# Patient Record
Sex: FEMALE | Race: WHITE | Employment: OTHER | URBAN - METROPOLITAN AREA
[De-identification: names, ages, dates, MRNs, and addresses within clinical notes are randomized per-mention and may not be internally consistent; named-entity substitution may affect disease eponyms.]

---

## 2023-08-16 LAB
C REACTIVE PROTEIN (MG/L) IN SER/PLAS: 0.37 MG/DL
COMPLEMENT C3 (MG/DL) IN SER/PLAS: 147 MG/DL (ref 87–200)
COMPLEMENT C4 (MG/DL) IN SER/PLAS: 29 MG/DL (ref 10–50)
CREATINE KINASE (U/L) IN SER/PLAS: 96 U/L (ref 0–215)

## 2023-08-18 LAB
ALBUMIN ELP: 4 G/DL (ref 3.4–5)
ALPHA 1: 0.3 G/DL (ref 0.2–0.6)
ALPHA 2: 0.6 G/DL (ref 0.4–1.1)
BETA: 0.7 G/DL (ref 0.5–1.2)
GAMMA GLOBULIN: 1.5 G/DL (ref 0.5–1.4)
PATH REVIEW-SERUM PROTEIN ELECTROPHORESIS: NORMAL
PROTEIN ELECTROPHORESIS INTERPRETATION: ABNORMAL
PROTEIN TOTAL: 7.1 G/DL (ref 6.4–8.2)

## 2024-03-25 RX ORDER — FAMOTIDINE 20 MG/1
1 TABLET, FILM COATED ORAL 2 TIMES DAILY
COMMUNITY
Start: 2022-04-20

## 2024-03-25 RX ORDER — DICYCLOMINE HYDROCHLORIDE 10 MG/1
CAPSULE ORAL EVERY 6 HOURS
COMMUNITY
Start: 2022-04-20

## 2024-03-25 RX ORDER — ZAFIRLUKAST 20 MG/1
1 TABLET, FILM COATED ORAL 2 TIMES DAILY
COMMUNITY
Start: 2022-04-20 | End: 2024-03-26 | Stop reason: WASHOUT

## 2024-03-25 RX ORDER — EPINEPHRINE 0.3 MG/.3ML
0.3 INJECTION INTRAMUSCULAR
COMMUNITY
Start: 2022-04-20

## 2024-03-25 RX ORDER — ERGOCALCIFEROL 1.25 MG/1
1 CAPSULE ORAL
COMMUNITY
Start: 2022-04-20

## 2024-03-25 RX ORDER — DIPHENOXYLATE HYDROCHLORIDE AND ATROPINE SULFATE 2.5; .025 MG/1; MG/1
TABLET ORAL 4 TIMES DAILY
COMMUNITY
Start: 2022-04-08

## 2024-03-25 RX ORDER — LEVONORGESTREL 52 MG/1
INTRAUTERINE DEVICE INTRAUTERINE
COMMUNITY
Start: 2022-04-20

## 2024-03-25 RX ORDER — LISDEXAMFETAMINE DIMESYLATE CAPSULES 20 MG/1
1 CAPSULE ORAL DAILY
COMMUNITY
Start: 2022-01-31 | End: 2024-03-26 | Stop reason: ALTCHOICE

## 2024-03-25 RX ORDER — IPRATROPIUM BROMIDE 17 UG/1
AEROSOL, METERED RESPIRATORY (INHALATION) 4 TIMES DAILY
COMMUNITY
Start: 2022-04-20

## 2024-03-25 RX ORDER — LEVALBUTEROL TARTRATE 45 UG/1
AEROSOL, METERED ORAL EVERY 6 HOURS
COMMUNITY
Start: 2022-02-23

## 2024-03-25 RX ORDER — RIZATRIPTAN BENZOATE 10 MG/1
TABLET ORAL
COMMUNITY
Start: 2022-04-20

## 2024-03-25 RX ORDER — SPIRONOLACTONE 25 MG/1
1 TABLET ORAL DAILY
COMMUNITY
Start: 2022-04-20

## 2024-03-25 RX ORDER — PROCHLORPERAZINE MALEATE 10 MG
TABLET ORAL
COMMUNITY
Start: 2022-05-16

## 2024-03-25 RX ORDER — MOMETASONE FUROATE AND FORMOTEROL FUMARATE DIHYDRATE 200; 5 UG/1; UG/1
2 AEROSOL RESPIRATORY (INHALATION) 2 TIMES DAILY
COMMUNITY
Start: 2022-03-15

## 2024-03-25 RX ORDER — OXYBUTYNIN CHLORIDE 10 MG/1
1 TABLET, EXTENDED RELEASE ORAL DAILY
COMMUNITY
Start: 2022-04-20

## 2024-03-25 RX ORDER — AZATHIOPRINE 50 MG/1
TABLET ORAL
COMMUNITY
Start: 2022-05-22 | End: 2024-03-26 | Stop reason: WASHOUT

## 2024-03-25 RX ORDER — FLUTICASONE PROPIONATE 50 MCG
SPRAY, SUSPENSION (ML) NASAL
COMMUNITY
Start: 2022-04-20

## 2024-03-26 ENCOUNTER — OFFICE VISIT (OUTPATIENT)
Dept: RHEUMATOLOGY | Facility: CLINIC | Age: 34
End: 2024-03-26
Payer: MEDICARE

## 2024-03-26 ENCOUNTER — HOSPITAL ENCOUNTER (OUTPATIENT)
Dept: RADIOLOGY | Facility: CLINIC | Age: 34
Discharge: HOME | End: 2024-03-26
Payer: MEDICARE

## 2024-03-26 VITALS
BODY MASS INDEX: 46.27 KG/M2 | DIASTOLIC BLOOD PRESSURE: 94 MMHG | HEART RATE: 90 BPM | WEIGHT: 253 LBS | RESPIRATION RATE: 20 BRPM | OXYGEN SATURATION: 96 % | SYSTOLIC BLOOD PRESSURE: 153 MMHG | TEMPERATURE: 99 F

## 2024-03-26 DIAGNOSIS — M25.50 HYPERMOBILITY ARTHRALGIA: ICD-10-CM

## 2024-03-26 DIAGNOSIS — M25.50 HYPERMOBILITY ARTHRALGIA: Primary | ICD-10-CM

## 2024-03-26 PROCEDURE — 99213 OFFICE O/P EST LOW 20 MIN: CPT | Performed by: INTERNAL MEDICINE

## 2024-03-26 PROCEDURE — 72040 X-RAY EXAM NECK SPINE 2-3 VW: CPT

## 2024-03-26 PROCEDURE — 72040 X-RAY EXAM NECK SPINE 2-3 VW: CPT | Performed by: RADIOLOGY

## 2024-03-26 PROCEDURE — 1036F TOBACCO NON-USER: CPT | Performed by: INTERNAL MEDICINE

## 2024-03-26 RX ORDER — BUDESONIDE 0.5 MG/2ML
0.5 INHALANT ORAL 2 TIMES DAILY
COMMUNITY
Start: 2024-01-17

## 2024-03-26 RX ORDER — OMEPRAZOLE 20 MG/1
20 CAPSULE, DELAYED RELEASE ORAL
COMMUNITY
Start: 2022-09-01

## 2024-03-26 RX ORDER — SODIUM CHLORIDE FOR INHALATION 3 %
4 VIAL, NEBULIZER (ML) INHALATION 2 TIMES DAILY
COMMUNITY
Start: 2023-10-26

## 2024-03-26 RX ORDER — SODIUM CHLORIDE, SODIUM LACTATE, POTASSIUM CHLORIDE, CALCIUM CHLORIDE 600; 310; 30; 20 MG/100ML; MG/100ML; MG/100ML; MG/100ML
1000 INJECTION, SOLUTION INTRAVENOUS
COMMUNITY
Start: 2024-03-19 | End: 2024-03-26

## 2024-03-26 RX ORDER — METHYLPREDNISOLONE 4 MG/1
TABLET ORAL
COMMUNITY
Start: 2024-02-12

## 2024-03-26 RX ORDER — SCOLOPAMINE TRANSDERMAL SYSTEM 1 MG/1
PATCH, EXTENDED RELEASE TRANSDERMAL
COMMUNITY
Start: 2024-03-04

## 2024-03-26 RX ORDER — MIDODRINE HYDROCHLORIDE 2.5 MG/1
2.5 TABLET ORAL EVERY 8 HOURS PRN
COMMUNITY
Start: 2022-09-30

## 2024-03-26 RX ORDER — CHLORHEXIDINE GLUCONATE 40 MG/ML
SOLUTION TOPICAL
COMMUNITY
Start: 2022-06-29

## 2024-03-26 RX ORDER — BENZOYL PEROXIDE 50 MG/ML
LIQUID TOPICAL
COMMUNITY
Start: 2023-07-18

## 2024-03-26 RX ORDER — BENZONATATE 100 MG/1
100 CAPSULE ORAL EVERY 8 HOURS PRN
COMMUNITY
Start: 2023-03-07

## 2024-03-26 RX ORDER — DEXTROAMPHETAMINE SACCHARATE, AMPHETAMINE ASPARTATE MONOHYDRATE, DEXTROAMPHETAMINE SULFATE AND AMPHETAMINE SULFATE 5; 5; 5; 5 MG/1; MG/1; MG/1; MG/1
20 CAPSULE, EXTENDED RELEASE ORAL
COMMUNITY
Start: 2023-10-14 | End: 2024-04-05

## 2024-03-26 RX ORDER — CYANOCOBALAMIN 1000 UG/ML
1000 INJECTION, SOLUTION INTRAMUSCULAR; SUBCUTANEOUS
COMMUNITY
Start: 2022-05-17

## 2024-03-26 RX ORDER — PROMETHAZINE HYDROCHLORIDE 25 MG/1
25 TABLET ORAL EVERY 6 HOURS PRN
COMMUNITY
Start: 2023-03-07

## 2024-03-26 RX ORDER — GUANFACINE 1 MG/1
1 TABLET, EXTENDED RELEASE ORAL NIGHTLY
COMMUNITY
Start: 2022-10-19

## 2024-03-26 RX ORDER — NAPROXEN 500 MG/1
500 TABLET ORAL EVERY 12 HOURS PRN
COMMUNITY
Start: 2023-06-23

## 2024-03-26 RX ORDER — LEVOCETIRIZINE DIHYDROCHLORIDE 5 MG/1
5 TABLET, FILM COATED ORAL
COMMUNITY
Start: 2024-01-17

## 2024-03-26 RX ORDER — AMMONIUM LACTATE 12 G/100G
CREAM TOPICAL
COMMUNITY
Start: 2022-09-29

## 2024-03-26 RX ORDER — CAPTOPRIL 12.5 MG/1
6.25 TABLET ORAL EVERY 12 HOURS PRN
COMMUNITY
Start: 2022-09-30

## 2024-03-26 RX ORDER — NITROFURANTOIN (MACROCRYSTALS) 100 MG/1
CAPSULE ORAL
COMMUNITY
Start: 2024-01-15

## 2024-03-26 RX ORDER — IBUPROFEN 800 MG/1
800 TABLET ORAL EVERY 8 HOURS PRN
COMMUNITY
Start: 2024-01-05

## 2024-03-26 RX ORDER — CLINDAMYCIN PHOSPHATE 10 UG/ML
LOTION TOPICAL
COMMUNITY
Start: 2023-07-18

## 2024-03-26 RX ORDER — TEZEPELUMAB-EKKO 210 MG/1.9ML
210 INJECTION, SOLUTION SUBCUTANEOUS
COMMUNITY

## 2024-03-26 RX ORDER — BUDESONIDE, GLYCOPYRROLATE, AND FORMOTEROL FUMARATE 160; 9; 4.8 UG/1; UG/1; UG/1
AEROSOL, METERED RESPIRATORY (INHALATION)
COMMUNITY

## 2024-03-26 RX ORDER — IPRATROPIUM BROMIDE 21 UG/1
SPRAY, METERED NASAL
COMMUNITY
Start: 2024-01-15

## 2024-03-26 RX ORDER — CYCLOSPORINE 0.5 MG/ML
EMULSION OPHTHALMIC
COMMUNITY
Start: 2024-01-15

## 2024-03-26 RX ORDER — AZITHROMYCIN 250 MG/1
TABLET, FILM COATED ORAL
COMMUNITY
Start: 2022-09-01

## 2024-03-26 RX ORDER — FLUTICASONE PROPIONATE 220 UG/1
AEROSOL, METERED RESPIRATORY (INHALATION)
COMMUNITY
Start: 2024-01-15

## 2024-03-26 RX ORDER — ACETYLCYSTEINE 100 MG/ML
SOLUTION ORAL; RESPIRATORY (INHALATION)
COMMUNITY

## 2024-03-26 ASSESSMENT — PAIN SCALES - GENERAL: PAINLEVEL: 4

## 2024-03-26 NOTE — PROGRESS NOTES
She complains of rather generalized musculoskeletal pain and stiffness.  She has not had any recurrence of uveitis symptoms.  She is on Remicade infusion therapy every 6 weeks.  She continues to take IVIG infusions every 3 weeks.  She was recently treated with steroids because of respiratory tract infection symptoms.  She has not noted any improvement in the musculoskeletal pain with taking the prednisone.  She notes dryness for which she uses Restasis eyedrops and moisturizing eyedrops.  She receives lactated Ringer's infusions for rehydration therapy.  She continues to note loose bowel movements endoscopies in the past have not been diagnostic of inflammatory bowel disease.  She notes some numbness in her hands and feet.  She notes improvement in neck pain with physical therapy and neck realignment.  She has noted increased vertigo symptoms.  She has been using a scopolamine patch.  She has noted more frequent migraine headaches.  Azathioprine was discontinued due to recurrent infections and nausea.    The lungs, heart, abdomen, and extremities are benign.  The musculoskeletal examination shows preserved passive range of motion of the upper and lower extremity joints without joint effusions.  There is mild diffuse tenderness in the upper and lower extremities.  The straight leg raise test is normal bilaterally in the seated position.    X-ray cervical spine (3/26/2024) 2 mm posterior subluxation of the deltoid from the anterior arch of C1 with neck flexion and resolution of the subluxation with neck extension.  No fractures or evidence of syndesmophyte formation.  MRI/MRA scan brain (3/4/2024) no evidence of intracranial mass or extra-axial fluid collection.  There is normal white matter signal.  The craniocervical junction is normal.  The left transverse and sigmoid sinus complex is larger than the right.    Laboratory (8/15/2023) CRP 0.37, CPK 96, C4 29, C3 147, serum protein electrophoresis: Polyclonal  gammopathy.    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  She has slight subluxation of the atlantoaxial joint not sufficient to be expected to cause the upper and lower extremity paresthesia.    She is to continue physical therapy as needed.  No new medications prescribed.  She is to return at the next available office appointment.

## 2024-07-03 DIAGNOSIS — Z00.6 RESEARCH SUBJECT: Primary | ICD-10-CM

## 2024-07-24 DIAGNOSIS — Z00.6 RESEARCH SUBJECT: Primary | ICD-10-CM

## 2024-07-24 DIAGNOSIS — T78.49XA OTHER ALLERGY, INITIAL ENCOUNTER: ICD-10-CM

## 2024-07-29 ENCOUNTER — HOSPITAL ENCOUNTER (OUTPATIENT)
Dept: RESPIRATORY THERAPY | Facility: HOSPITAL | Age: 34
Discharge: HOME | End: 2024-07-29
Payer: MEDICARE

## 2024-07-29 ENCOUNTER — CLINICAL SUPPORT (OUTPATIENT)
Dept: PEDIATRIC PULMONOLOGY | Facility: HOSPITAL | Age: 34
End: 2024-07-29
Payer: MEDICARE

## 2024-07-29 DIAGNOSIS — T78.49XA OTHER ALLERGY, INITIAL ENCOUNTER: ICD-10-CM

## 2024-07-29 DIAGNOSIS — Z00.6 RESEARCH SUBJECT: ICD-10-CM

## 2024-07-29 LAB
ALBUMIN SERPL BCP-MCNC: 4.1 G/DL (ref 3.4–5)
ALP SERPL-CCNC: 43 U/L (ref 33–110)
ALT SERPL W P-5'-P-CCNC: 13 U/L (ref 7–45)
ANION GAP SERPL CALC-SCNC: 17 MMOL/L (ref 10–20)
APPEARANCE UR: ABNORMAL
AST SERPL W P-5'-P-CCNC: 17 U/L (ref 9–39)
BACTERIA #/AREA URNS AUTO: ABNORMAL /HPF
BASOPHILS # BLD AUTO: 0.04 X10*3/UL (ref 0–0.1)
BASOPHILS NFR BLD AUTO: 0.5 %
BILIRUB SERPL-MCNC: 0.4 MG/DL (ref 0–1.2)
BILIRUB UR STRIP.AUTO-MCNC: NEGATIVE MG/DL
BUN SERPL-MCNC: 16 MG/DL (ref 6–23)
CALCIUM SERPL-MCNC: 9.4 MG/DL (ref 8.6–10.6)
CHLORIDE SERPL-SCNC: 103 MMOL/L (ref 98–107)
CO2 SERPL-SCNC: 21 MMOL/L (ref 21–32)
COLOR UR: YELLOW
CREAT SERPL-MCNC: 0.58 MG/DL (ref 0.5–1.05)
EGFRCR SERPLBLD CKD-EPI 2021: >90 ML/MIN/1.73M*2
EOSINOPHIL # BLD AUTO: 0.02 X10*3/UL (ref 0–0.7)
EOSINOPHIL NFR BLD AUTO: 0.3 %
ERYTHROCYTE [DISTWIDTH] IN BLOOD BY AUTOMATED COUNT: 12.1 % (ref 11.5–14.5)
FEF 25-75: 1.33 L/S
FEV1/FVC: 61 %
FEV1: 2.45 LITERS
FVC: 4.02 LITERS
GLUCOSE SERPL-MCNC: 87 MG/DL (ref 74–99)
GLUCOSE UR STRIP.AUTO-MCNC: NORMAL MG/DL
HCG UR QL IA.RAPID: NEGATIVE
HCT VFR BLD AUTO: 39.2 % (ref 36–46)
HGB BLD-MCNC: 14.1 G/DL (ref 12–16)
IMM GRANULOCYTES # BLD AUTO: 0.03 X10*3/UL (ref 0–0.7)
IMM GRANULOCYTES NFR BLD AUTO: 0.4 % (ref 0–0.9)
KETONES UR STRIP.AUTO-MCNC: NEGATIVE MG/DL
LEUKOCYTE ESTERASE UR QL STRIP.AUTO: ABNORMAL
LYMPHOCYTES # BLD AUTO: 3.33 X10*3/UL (ref 1.2–4.8)
LYMPHOCYTES NFR BLD AUTO: 42.7 %
MCH RBC QN AUTO: 28.6 PG (ref 26–34)
MCHC RBC AUTO-ENTMCNC: 36 G/DL (ref 32–36)
MCV RBC AUTO: 80 FL (ref 80–100)
MONOCYTES # BLD AUTO: 0.45 X10*3/UL (ref 0.1–1)
MONOCYTES NFR BLD AUTO: 5.8 %
MUCOUS THREADS #/AREA URNS AUTO: ABNORMAL /LPF
NEUTROPHILS # BLD AUTO: 3.93 X10*3/UL (ref 1.2–7.7)
NEUTROPHILS NFR BLD AUTO: 50.3 %
NITRITE UR QL STRIP.AUTO: NEGATIVE
NRBC BLD-RTO: 0 /100 WBCS (ref 0–0)
PEF: 5.84 L/S
PH UR STRIP.AUTO: 6 [PH]
PLATELET # BLD AUTO: 285 X10*3/UL (ref 150–450)
POTASSIUM SERPL-SCNC: 4 MMOL/L (ref 3.5–5.3)
PROT SERPL-MCNC: 7.8 G/DL (ref 6.4–8.2)
PROT UR STRIP.AUTO-MCNC: ABNORMAL MG/DL
RBC # BLD AUTO: 4.93 X10*6/UL (ref 4–5.2)
RBC # UR STRIP.AUTO: NEGATIVE /UL
RBC #/AREA URNS AUTO: ABNORMAL /HPF
SODIUM SERPL-SCNC: 137 MMOL/L (ref 136–145)
SP GR UR STRIP.AUTO: 1.03
SQUAMOUS #/AREA URNS AUTO: ABNORMAL /HPF
UROBILINOGEN UR STRIP.AUTO-MCNC: NORMAL MG/DL
WBC # BLD AUTO: 7.8 X10*3/UL (ref 4.4–11.3)
WBC #/AREA URNS AUTO: ABNORMAL /HPF

## 2024-07-29 PROCEDURE — 86003 ALLG SPEC IGE CRUDE XTRC EA: CPT | Performed by: STUDENT IN AN ORGANIZED HEALTH CARE EDUCATION/TRAINING PROGRAM

## 2024-07-29 PROCEDURE — 86003 ALLG SPEC IGE CRUDE XTRC EA: CPT

## 2024-07-29 PROCEDURE — 81025 URINE PREGNANCY TEST: CPT | Performed by: STUDENT IN AN ORGANIZED HEALTH CARE EDUCATION/TRAINING PROGRAM

## 2024-07-29 PROCEDURE — 85025 COMPLETE CBC W/AUTO DIFF WBC: CPT | Performed by: STUDENT IN AN ORGANIZED HEALTH CARE EDUCATION/TRAINING PROGRAM

## 2024-07-29 PROCEDURE — 36415 COLL VENOUS BLD VENIPUNCTURE: CPT

## 2024-07-29 PROCEDURE — 80053 COMPREHEN METABOLIC PANEL: CPT | Performed by: STUDENT IN AN ORGANIZED HEALTH CARE EDUCATION/TRAINING PROGRAM

## 2024-07-29 PROCEDURE — 81001 URINALYSIS AUTO W/SCOPE: CPT | Performed by: STUDENT IN AN ORGANIZED HEALTH CARE EDUCATION/TRAINING PROGRAM

## 2024-07-30 LAB
A ALTERNATA IGE QN: <0.1 KU/L
CAT DANDER IGE QN: <0.1 KU/L
COMMON RAGWEED IGE QN: <0.1 KU/L
D PTERONYSS IGE QN: <0.1 KU/L
DOG DANDER IGE QN: <0.1 KU/L
ROACH IGE QN: <0.1 KU/L
TIMOTHY IGE QN: <0.1 KU/L
WHITE OAK IGE QN: <0.1 KU/L

## 2024-07-31 LAB
ANNOTATION COMMENT IMP: NORMAL
MOUSE EPITH IGE QN: <0.1 KU/L

## 2024-08-09 ENCOUNTER — APPOINTMENT (OUTPATIENT)
Dept: RHEUMATOLOGY | Facility: CLINIC | Age: 34
End: 2024-08-09
Payer: MEDICARE

## 2024-08-09 VITALS
SYSTOLIC BLOOD PRESSURE: 125 MMHG | BODY MASS INDEX: 46.08 KG/M2 | HEIGHT: 62 IN | OXYGEN SATURATION: 95 % | DIASTOLIC BLOOD PRESSURE: 77 MMHG | HEART RATE: 79 BPM | WEIGHT: 250.4 LBS | TEMPERATURE: 98.3 F

## 2024-08-09 DIAGNOSIS — Z86.69 HISTORY OF IRITIS: Primary | ICD-10-CM

## 2024-08-09 RX ORDER — ELETRIPTAN HYDROBROMIDE 40 MG/1
40 TABLET, FILM COATED ORAL
COMMUNITY
Start: 2024-03-28

## 2024-08-09 RX ORDER — ZAFIRLUKAST 20 MG/1
20 TABLET, FILM COATED ORAL 2 TIMES DAILY
COMMUNITY
Start: 2024-07-17

## 2024-08-09 RX ORDER — SODIUM CHLORIDE, SODIUM LACTATE, POTASSIUM CHLORIDE, CALCIUM CHLORIDE 600; 310; 30; 20 MG/100ML; MG/100ML; MG/100ML; MG/100ML
1000 INJECTION, SOLUTION INTRAVENOUS
COMMUNITY
Start: 2024-04-11

## 2024-08-09 RX ORDER — TRETINOIN 1 MG/G
CREAM TOPICAL
COMMUNITY
Start: 2024-06-17

## 2024-08-09 RX ORDER — POTASSIUM CHLORIDE 750 MG/1
10 TABLET, EXTENDED RELEASE ORAL
COMMUNITY
Start: 2024-06-13 | End: 2024-09-11

## 2024-08-09 RX ORDER — GABAPENTIN 300 MG/1
300 CAPSULE ORAL
COMMUNITY
Start: 2024-04-24 | End: 2025-04-24

## 2025-02-10 ENCOUNTER — APPOINTMENT (OUTPATIENT)
Dept: RHEUMATOLOGY | Facility: CLINIC | Age: 35
End: 2025-02-10
Payer: MEDICARE

## 2025-02-10 VITALS
DIASTOLIC BLOOD PRESSURE: 98 MMHG | HEIGHT: 62 IN | BODY MASS INDEX: 43.58 KG/M2 | TEMPERATURE: 97.9 F | HEART RATE: 99 BPM | OXYGEN SATURATION: 98 % | SYSTOLIC BLOOD PRESSURE: 149 MMHG | WEIGHT: 236.8 LBS

## 2025-02-10 DIAGNOSIS — M25.50 POLYARTHRALGIA: Primary | ICD-10-CM

## 2025-02-10 PROCEDURE — 99213 OFFICE O/P EST LOW 20 MIN: CPT | Performed by: INTERNAL MEDICINE

## 2025-02-10 PROCEDURE — 1036F TOBACCO NON-USER: CPT | Performed by: INTERNAL MEDICINE

## 2025-02-10 PROCEDURE — 3008F BODY MASS INDEX DOCD: CPT | Performed by: INTERNAL MEDICINE

## 2025-02-10 ASSESSMENT — PAIN SCALES - GENERAL: PAINLEVEL_OUTOF10: 3

## 2025-02-10 NOTE — PROGRESS NOTES
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  She was treated with steroid eyedrops.  She continues to take Remicade infusions every 6 weeks and intravenous immunoglobulin therapy every 3 weeks.  She also notes diffuse pain in her hands.  She has noted some slight swelling in the proximal aspect of the digits of both hands intermittently.  She has some more generalized musculoskeletal pain.  She has history of recurrent abdominal discomfort.  She has history having had recurrent mouth sores and has old hypopigmented patches on upper arms and on her thighs.  She reports that she is doing consider possibly increasing the intravenous immunoglobulin therapy rather than add other immune suppressing medications in view of her history of common variable immunodeficiency disorder.  She has multiple lipomas  over the years that have been removed. She has history of recurrent painful mouth sores.    She is overweight.  The sclera are not injected.  There is no drainage from either eye.  The lungs, heart, abdomen, and extremities are benign.  The musculoskeletal examination shows preserved passive range of motion of the upper and lower extremity joints without joint effusions.  The integument shows a few sparsely scattered hypopigmented round to oval macules on the lateral aspect of the left left upper arm.    Laboratory (2//2025) WBC 9.06, hemoglobin 14.7, hematocrit 42.2, MCV 85.3, MCHC 34.8, platelets 269, albumin 3.8, calcium 9.1, alkaline phosphatase 50, AST 21, ALT 19, glucose 82, BUN 12, creatinine 0.63.    Chest x-ray (10/22/2024) normal.      MRI scan left knee (9/10/2024) mild marrow edema in the lateral patella  Severe persistent asthma she has history of common variable immunodeficiency disorder, asthma, ADD, hypertension, irritable bowel syndrome, dry eyes, polycystic ovarian syndrome, hypermobility, POTS, Cushing syndrome, history of  recurrent uveitis/iritis, inflammatory arthritis, negative HLA-B27.     Consider increasing intravenous immunoglobulin therapy  and continue every 3 weeks.  Perhaps, the Remicade infusion may be reduced in the future.  Continue follow-up with allergy and immunology and her primary rheumatologist.  She is to return in 6 to 8 months for reevaluation.

## 2025-08-25 ENCOUNTER — APPOINTMENT (OUTPATIENT)
Dept: RHEUMATOLOGY | Facility: CLINIC | Age: 35
End: 2025-08-25
Payer: MEDICARE

## 2025-09-19 ENCOUNTER — APPOINTMENT (OUTPATIENT)
Dept: RHEUMATOLOGY | Facility: CLINIC | Age: 35
End: 2025-09-19
Payer: COMMERCIAL